# Patient Record
Sex: MALE | Race: BLACK OR AFRICAN AMERICAN | URBAN - METROPOLITAN AREA
[De-identification: names, ages, dates, MRNs, and addresses within clinical notes are randomized per-mention and may not be internally consistent; named-entity substitution may affect disease eponyms.]

---

## 2021-03-24 ENCOUNTER — HOSPITAL ENCOUNTER (OUTPATIENT)
Dept: SLEEP MEDICINE | Age: 41
Discharge: HOME | End: 2021-03-24
Payer: COMMERCIAL

## 2021-03-24 PROCEDURE — 95810 POLYSOM 6/> YRS 4/> PARAM: CPT

## 2021-04-05 PROBLEM — G47.33 OSA (OBSTRUCTIVE SLEEP APNEA): Status: ACTIVE | Noted: 2021-04-05

## 2021-04-05 PROBLEM — E66.01 MORBID OBESITY WITH BMI OF 40.0-44.9, ADULT (HCC): Status: ACTIVE | Noted: 2021-04-05

## 2021-04-05 PROBLEM — G47.00 FREQUENT NOCTURNAL AWAKENING: Status: ACTIVE | Noted: 2021-04-05

## 2022-03-19 PROBLEM — G47.00 FREQUENT NOCTURNAL AWAKENING: Status: ACTIVE | Noted: 2021-04-05

## 2022-03-19 PROBLEM — G47.33 OSA (OBSTRUCTIVE SLEEP APNEA): Status: ACTIVE | Noted: 2021-04-05

## 2022-03-20 PROBLEM — E66.01 MORBID OBESITY WITH BMI OF 40.0-44.9, ADULT (HCC): Status: ACTIVE | Noted: 2021-04-05

## 2022-04-28 PROBLEM — G47.00 INSOMNIA: Status: ACTIVE | Noted: 2022-04-28

## 2022-09-20 NOTE — PROGRESS NOTES
[address] [place] [address] Court, [address] [facility]  [phone]    Patient Name:  Nerissa Cordova  YOB: 1980    Pursuant to the emergency declaration under the [address] [place] [address] waiver authority and the [employer] and [employer] Act, this Virtual  Visit was conducted, with patient's consent, to reduce the patient's risk of exposure to COVID-19 and provide continuity of care for an established patient. Telehealth encounter is a billable service, with coverage as determined by the insurance carrier. Services were provided through a video synchronous discussion virtually to substitute for in-person clinic visit. Nerissa Cordova is a 38 y.o. male who was seen by synchronous (real-time) audio-video technology on 9/22/2022. Consent:  He and/or his healthcare decision maker is aware that this patient-initiated Telehealth encounter is a billable service, with coverage as determined by his insurance carrier. He is aware that he may receive a bill and has provided verbal consent to proceed: Yes        Office Visit 9/22/2022    CHIEF COMPLAINT:    Chief Complaint   Patient presents with    Sleep Apnea    Medication Refill         HISTORY OF PRESENT ILLNESS:  Patient is being seen today via virtual visit. Sleep study completed 3/24/2021 with AHI 9.2 events per hour with lowest oxygen saturation 81%. He is prescribed auto CPAP 5 to 15 cm using a nasal mask. Download reveals 80% compliance over the last 30 days with average nightly use six hours and 46 minutes. AHI is 1.5 events per hour with average pressure used 7 to 10.6 cm. He feels the initial pressure is too low and pressure will be adjusted to APAP 7 to 15 cm. He states that he wakes twice in the night but is able to go back to sleep easily. He takes Lunesta 2 mg and trazodone 100 mg nightly to help with insomnia.  He continues to have improved sleep quality and feels rested when he takes his medications. He denies any side effects. He last filled lunesta on 8/24/22. There is no evidence of abuse on the [place] scripts database.        Sleep Medicine 9/22/2022   Sitting and reading 0   Watching TV 0   Sitting, inactive in a public place (e.g. a theatre or a meeting) 0   As a passenger in a car for an hour without a break 0   Lying down to rest in the afternoon when circumstances permit 2   Sitting and talking to someone 0   Sitting quietly after a lunch without alcohol 0   In a car, while stopped for a few minutes in traffic 0   [place] Sleepiness Score 2               Past Medical History:   Diagnosis Date    Abnormal TSH     Adult BMI 40.0-44.9 kg/sq m ([address]     Allergic reaction     Benign essential hypertension     H/O seasonal allergies     Hyperlipidemia     Morbid obesity ([address]     Other ill-defined conditions(799.89)     staph    Tachycardia     Vitamin D deficiency        Patient Active Problem List   Diagnosis    Benign essential hypertension    Frequent nocturnal awakening    H/O seasonal allergies    Tachycardia    ERENDIRA (obstructive sleep apnea)    Morbid obesity with BMI of 40.0-44.9, adult ([address]    Insomnia           Past Surgical History:   Procedure Laterality Date    CARPAL TUNNEL RELEASE Right            Social History     Socioeconomic History    Marital status:      Spouse name: Not on file    Number of children: Not on file    Years of education: Not on file    Highest education level: Not on file   Occupational History    Not on file   Tobacco Use    Smoking status: Never    Smokeless tobacco: Never   Substance and Sexual Activity    Alcohol use: Yes    Drug use: No    Sexual activity: Not on file   Other Topics Concern    Not on file   Social History Narrative    Not on file     Social Determinants of Health     Financial Resource Strain: Not on file   Food Insecurity: Not on file   Transportation Needs: Not on file Physical Activity: Not on file   Stress: Not on file   Social Connections: Not on file   Intimate Partner Violence: Not on file   Housing Stability: Not on file         History reviewed. No pertinent family history. Allergies   Allergen Reactions    Lisinopril Angioedema    Penicillins Other (See Comments)    Sulfa Antibiotics Other (See Comments)         Current Outpatient Medications   Medication Sig    eszopiclone (LUNESTA) 2 MG TABS Take 1 tablet by mouth nightly. traZODone (DESYREL) 100 MG tablet Take 1 tablet by mouth nightly as needed for Sleep    amLODIPine (NORVASC) 10 MG tablet Take by mouth daily    Cholecalciferol 50 MCG (2000 UT) CAPS Take by mouth 2 times daily    hydroCHLOROthiazide (HYDRODIURIL) 25 MG tablet TAKE 1 TABLET(25 MG) BY MOUTH 1 TIME EACH DAY    metoprolol succinate (TOPROL XL) 25 MG extended release tablet Take 25 mg by mouth daily    triamcinolone (NASACORT) 55 MCG/ACT nasal inhaler 2 sprays daily     No current facility-administered medications for this visit. REVIEW OF SYSTEMS:   CONSTITUTIONAL:   There is no history of fever, chills, night sweats. CARDIAC:   No chest pain, pressure, discomfort, palpitations, orthopnea, murmurs, or edema. GI:   No dysphagia, heartburn reflux, nausea/vomiting, diarrhea, abdominal pain, or bleeding. NEURO:   There is no history of AMS, persistent headache, decreased level of consciousness, seizures, or motor or sensory deficits. VIRTUAL EXAM  PHYSICAL EXAMINATION:  [ INSTRUCTIONS:  \"[x]\" Indicates a positive item  \"[]\" Indicates a negative item   Vital Signs: (As obtained by patient/caregiver at home)  There were no vitals taken for this visit.      Constitutional: [x] Appears well-developed and well-nourished [x] No apparent distress      [] Abnormal     Mental status: [x] Alert and awake  [x] Oriented to person/place/time [x] Able to follow commands    [] Abnormal -     Eyes:   EOM    [x]  Normal    [] Abnormal - Sclera  [x]  Normal    [] Abnormal -          Discharge [x]  None visible   [] Abnormal -    HENT: [x] Normocephalic, atraumatic  [] Abnormal -  [x] Mouth/Throat: Mucous membranes are moist    External Ears [x] Normal  [] Abnormal -    Neck: [x] No visualized mass [] Abnormal -     Pulmonary/Chest: [x] Respiratory effort normal   [x] No visualized signs of difficulty breathing or respiratory distress        [] Abnormal -      Musculoskeletal:   [x] Normal gait with no signs of ataxia         [x] Normal range of motion of neck        [] Abnormal -     Neurological:        [x] No Facial Asymmetry (Cranial nerve 7 motor function) (limited exam due to video visit)          [x] No gaze palsy        [] Abnormal -         Skin:        [x] No significant exanthematous lesions or discoloration noted on facial skin         [] Abnormal -            Psychiatric:       [x] Normal Affect [] Abnormal -       [x] No Hallucinations    Other pertinent observable physical exam findings:-      ASSESSMENT:  (Medical Decision Making)      Diagnosis Orders   1. ERENDIRA (obstructive sleep apnea)     AHI well controlled. Will change settings to help with tolerance. 2. Insomnia, unspecified type     Improved on lunesta and trazodone   3.  Frequent nocturnal awakening     Improved with medications         PLAN:  Change pressure to APAP 7-15 cm  Renew supplies  Refill lunesta and trazodone  Follow up in 6 months or sooner if needed     Orders Placed This Encounter   Procedures    DME - [address]  Change pressure to APAP 7-15 cm    [facility]  Phone: [address] [address]  Dept: [phone]      Patient Name: Klaus Callaway  : 1980  Gender: male  Address: [address] [place] [address] [address] [id number]   Patient phone: [phone] (home)       Primary Insurance: Payor: Inga Harrison / Plan: Carlos Be / Product Type: *No Product type* /   Subscriber ID: [id number] - ([facility])      AMB Supply Order  Order Details     DME Location:   Order Date: 9/22/2022   The primary encounter diagnosis was ERENDIRA (obstructive sleep apnea). Diagnoses of Insomnia, unspecified type and Frequent nocturnal awakening were also pertinent to this visit. (  X   )Supplies Needed       apap Machine   (     ) CPAP Unit  (     ) Auto CPAP Unit  (     ) BiLevel Unit  (     ) Auto BiLevel Unit  (     ) ASV        (     ) Bilevel ST      Length of need: 12 months    Pressure:  7-15 cmH20  EPR:     Starting Ramp Pressure:   cm H20  Ramp Time: min        Patient had a diagnostic Apnea Hypopnea Index (AHI) of :    *SUPPLIES* Replace all as needed, or per coverage guidelines     Masks Type:  (    ) -Full Face Mask (1 per 3 mon)  (    ) -Full Mask (1 per month) Interface/Cushion      ( x ) -Nasal Mask (1 per 3 mon)  ( x  ) - Nasal Mask (1 per month) Interface/Cushion  (  x   ) -Pillow (2 per mon)  (     ) -Lkvubxcfl (1 per 6 mon)            Other Supplies:    (   X  )-Gfvspnmc (1 per 6 mon)  (   X  )-Ugsdkr Tubing (1 per 3 mon)  (   X  )- Disposable Filter (2 per mon)  (   x  )-Fpspvl Humidifier (1 per year)     (  x   )-Hzkbisomx (sometimes used with Full Face Mask) (1 per 6 mos)  (    )-Tubing-without heat (1 per 3 mos)  (     )-Non-Disposable Filter (1 per 6 mos)  (  x   )-Water Chamber (1 per 6 mos)  (     )-Humidifier non-heated (1 per 5 yrs)      Signed Date: 9/22/2022  Electronically Signed By: YUKO Diaz - LISY  Electronically Dated:  9/22/2022       Orders Placed This Encounter   Medications    eszopiclone (LUNESTA) 2 MG TABS     Sig: Take 1 tablet by mouth nightly.      Dispense:  30 tablet     Refill:  5    traZODone (DESYREL) 100 MG tablet     Sig: Take 1 tablet by mouth nightly as needed for Sleep     Dispense:  30 tablet     Refill:  5            Collaborating Physician: Dr. Rosalio Schwarz      I spent at least 20 minutes with this established patient, and >50% of the time was spent counseling and/or coordinating care regarding vivien, insomnia.     YUKO Arellano - CNP  Electronically signed

## 2022-09-22 ENCOUNTER — TELEMEDICINE (OUTPATIENT)
Dept: SLEEP MEDICINE | Age: 42
End: 2022-09-22
Payer: COMMERCIAL

## 2022-09-22 DIAGNOSIS — G47.00 FREQUENT NOCTURNAL AWAKENING: ICD-10-CM

## 2022-09-22 DIAGNOSIS — G47.33 OSA (OBSTRUCTIVE SLEEP APNEA): Primary | ICD-10-CM

## 2022-09-22 DIAGNOSIS — G47.00 INSOMNIA, UNSPECIFIED TYPE: ICD-10-CM

## 2022-09-22 PROCEDURE — 99214 OFFICE O/P EST MOD 30 MIN: CPT | Performed by: NURSE PRACTITIONER

## 2022-09-22 RX ORDER — ESZOPICLONE 2 MG/1
2 TABLET, FILM COATED ORAL NIGHTLY
Qty: 30 TABLET | Refills: 5 | Status: SHIPPED | OUTPATIENT
Start: 2022-09-22 | End: 2023-09-22

## 2022-09-22 RX ORDER — TRAZODONE HYDROCHLORIDE 100 MG/1
100 TABLET ORAL NIGHTLY PRN
Qty: 30 TABLET | Refills: 5 | Status: SHIPPED | OUTPATIENT
Start: 2022-09-22

## 2022-09-22 ASSESSMENT — SLEEP AND FATIGUE QUESTIONNAIRES
HOW LIKELY ARE YOU TO NOD OFF OR FALL ASLEEP WHILE SITTING AND TALKING TO SOMEONE: 0
HOW LIKELY ARE YOU TO NOD OFF OR FALL ASLEEP WHILE SITTING AND READING: 0
HOW LIKELY ARE YOU TO NOD OFF OR FALL ASLEEP WHILE WATCHING TV: 0
HOW LIKELY ARE YOU TO NOD OFF OR FALL ASLEEP WHEN YOU ARE A PASSENGER IN A CAR FOR AN HOUR WITHOUT A BREAK: 0
HOW LIKELY ARE YOU TO NOD OFF OR FALL ASLEEP WHILE LYING DOWN TO REST IN THE AFTERNOON WHEN CIRCUMSTANCES PERMIT: 2
HOW LIKELY ARE YOU TO NOD OFF OR FALL ASLEEP WHILE SITTING INACTIVE IN A PUBLIC PLACE: 0
HOW LIKELY ARE YOU TO NOD OFF OR FALL ASLEEP IN A CAR, WHILE STOPPED FOR A FEW MINUTES IN TRAFFIC: 0
HOW LIKELY ARE YOU TO NOD OFF OR FALL ASLEEP WHILE SITTING QUIETLY AFTER LUNCH WITHOUT ALCOHOL: 0
ESS TOTAL SCORE: 2

## 2023-01-17 ENCOUNTER — CLINICAL DOCUMENTATION (OUTPATIENT)
Dept: SLEEP MEDICINE | Age: 43
End: 2023-01-17

## 2023-01-17 NOTE — PROGRESS NOTES
Patient came to window to drop off paperwork that needs to be filled out by Matias. Patient said paperwork can be faxed to number that's listed in the documents. I put white envelope in Nikia[place]ake dunaway.

## 2023-01-23 ENCOUNTER — CLINICAL DOCUMENTATION (OUTPATIENT)
Dept: SLEEP MEDICINE | Age: 43
End: 2023-01-23

## 2023-02-22 ENCOUNTER — TELEPHONE (OUTPATIENT)
Dept: SLEEP MEDICINE | Age: 43
End: 2023-02-22

## 2023-03-01 ENCOUNTER — TELEPHONE (OUTPATIENT)
Dept: PULMONOLOGY | Age: 43
End: 2023-03-01

## 2023-03-21 DIAGNOSIS — G47.00 INSOMNIA, UNSPECIFIED TYPE: ICD-10-CM

## 2023-03-21 DIAGNOSIS — G47.33 OSA (OBSTRUCTIVE SLEEP APNEA): ICD-10-CM

## 2023-03-21 DIAGNOSIS — G47.00 FREQUENT NOCTURNAL AWAKENING: ICD-10-CM

## 2023-03-21 RX ORDER — ESZOPICLONE 2 MG/1
TABLET, FILM COATED ORAL
Qty: 30 TABLET | OUTPATIENT
Start: 2023-03-21

## 2023-03-21 NOTE — PROGRESS NOTES
3/23/2023     Orders Placed This Encounter   Medications    traZODone (DESYREL) 100 MG tablet     Sig: Take 1 tablet by mouth nightly as needed for Sleep     Dispense:  30 tablet     Refill:  5    eszopiclone (LUNESTA) 2 MG TABS     Sig: Take 1 tablet by mouth nightly. Dispense:  30 tablet     Refill:  5          Collaborating Physician: Dr. Gus Mitchell      I spent at least 20 minutes with this established patient, and >50% of the time was spent counseling and/or coordinating care regarding vivien.     YUKO Zavala CNP  Electronically signed

## 2023-03-23 ENCOUNTER — TELEMEDICINE (OUTPATIENT)
Dept: SLEEP MEDICINE | Age: 43
End: 2023-03-23
Payer: COMMERCIAL

## 2023-03-23 DIAGNOSIS — G47.33 OSA (OBSTRUCTIVE SLEEP APNEA): Primary | ICD-10-CM

## 2023-03-23 DIAGNOSIS — G47.00 FREQUENT NOCTURNAL AWAKENING: ICD-10-CM

## 2023-03-23 DIAGNOSIS — G47.00 INSOMNIA, UNSPECIFIED TYPE: ICD-10-CM

## 2023-03-23 PROCEDURE — 99214 OFFICE O/P EST MOD 30 MIN: CPT | Performed by: NURSE PRACTITIONER

## 2023-03-23 RX ORDER — ESZOPICLONE 2 MG/1
2 TABLET, FILM COATED ORAL NIGHTLY
Qty: 30 TABLET | Refills: 5 | Status: SHIPPED | OUTPATIENT
Start: 2023-03-23 | End: 2024-03-22

## 2023-03-23 RX ORDER — TRAZODONE HYDROCHLORIDE 100 MG/1
100 TABLET ORAL NIGHTLY PRN
Qty: 30 TABLET | Refills: 5 | Status: SHIPPED | OUTPATIENT
Start: 2023-03-23

## 2023-03-23 ASSESSMENT — SLEEP AND FATIGUE QUESTIONNAIRES
HOW LIKELY ARE YOU TO NOD OFF OR FALL ASLEEP WHEN YOU ARE A PASSENGER IN A CAR FOR AN HOUR WITHOUT A BREAK: 0
ESS TOTAL SCORE: 4
HOW LIKELY ARE YOU TO NOD OFF OR FALL ASLEEP WHILE WATCHING TV: 1
HOW LIKELY ARE YOU TO NOD OFF OR FALL ASLEEP WHILE SITTING AND READING: 0
HOW LIKELY ARE YOU TO NOD OFF OR FALL ASLEEP WHILE SITTING QUIETLY AFTER LUNCH WITHOUT ALCOHOL: 0
HOW LIKELY ARE YOU TO NOD OFF OR FALL ASLEEP WHILE SITTING INACTIVE IN A PUBLIC PLACE: 0
HOW LIKELY ARE YOU TO NOD OFF OR FALL ASLEEP WHILE SITTING AND TALKING TO SOMEONE: 0
HOW LIKELY ARE YOU TO NOD OFF OR FALL ASLEEP IN A CAR, WHILE STOPPED FOR A FEW MINUTES IN TRAFFIC: 0
HOW LIKELY ARE YOU TO NOD OFF OR FALL ASLEEP WHILE LYING DOWN TO REST IN THE AFTERNOON WHEN CIRCUMSTANCES PERMIT: 3

## 2023-09-19 DIAGNOSIS — G47.00 INSOMNIA, UNSPECIFIED TYPE: ICD-10-CM

## 2023-09-19 DIAGNOSIS — G47.33 OSA (OBSTRUCTIVE SLEEP APNEA): ICD-10-CM

## 2023-09-19 DIAGNOSIS — G47.00 FREQUENT NOCTURNAL AWAKENING: ICD-10-CM

## 2023-09-20 ENCOUNTER — PATIENT MESSAGE (OUTPATIENT)
Dept: SLEEP MEDICINE | Age: 43
End: 2023-09-20

## 2023-09-20 RX ORDER — ESZOPICLONE 2 MG/1
2 TABLET, FILM COATED ORAL
Qty: 30 TABLET | OUTPATIENT
Start: 2023-09-20

## 2023-09-21 NOTE — PROGRESS NOTES
Abnormal -  [x] Mouth/Throat: Mucous membranes are moist    External Ears [x] Normal  [] Abnormal -    Neck: [x] No visualized mass [] Abnormal -     Pulmonary/Chest: [x] Respiratory effort normal   [x] No visualized signs of difficulty breathing or respiratory distress        [] Abnormal -      Musculoskeletal:   [x] Normal gait with no signs of ataxia         [x] Normal range of motion of neck        [] Abnormal -     Neurological:        [x] No Facial Asymmetry (Cranial nerve 7 motor function) (limited exam due to video visit)          [x] No gaze palsy        [] Abnormal -         Skin:        [x] No significant exanthematous lesions or discoloration noted on facial skin         [] Abnormal -            Psychiatric:       [x] Normal Affect [] Abnormal -       [x] No Hallucinations    Other pertinent observable physical exam findings:-      ASSESSMENT:  (Medical Decision Making)      Diagnosis Orders   1. ERENDIRA (obstructive sleep apnea)  eszopiclone (LUNESTA) 2 MG TABS   he is using and benefiting from Pap therapy. AHI is well controlled. Continue current settings DME - DURABLE MEDICAL EQUIPMENT      2. Insomnia, unspecified type  eszopiclone (LUNESTA) 2 MG TABS   refill Lunesta and trazodone DME - DURABLE MEDICAL EQUIPMENT      3.  Frequent nocturnal awakening  eszopiclone (LUNESTA) 2 MG TABS   this is improved on CPAP and medication DME - DURABLE MEDICAL EQUIPMENT           PLAN:  Continue APAP 7-15 cm  Renew supplies  Refill lunesta and trazodone    Follow up will be in 6 months for med refill or sooner if needed    Orders Placed This Encounter   Procedures    DME - [facility]  Phone: [address] [place] [address] [phone] [facility] [address]  Dept: [phone]      Patient Name: Nichol Roberto  : 1980  Gender: male  Address: [address] [place] [address],  [address] [place] [address]   Patient phone: [phone] (home)       Primary Insurance:

## 2023-09-21 NOTE — TELEPHONE ENCOUNTER
From: Divine Frazier  To: Anita Langston  Sent: 9/20/2023 4:24 PM EDT  Subject: Marrian Vikram    I have a visit scheduled for next week. My Lunesta prescription goes out today. I requested a refill and I was told it was denied. Can you please submit it for a refill so I wont be out.

## 2023-09-25 ENCOUNTER — TELEMEDICINE (OUTPATIENT)
Dept: SLEEP MEDICINE | Age: 43
End: 2023-09-25
Payer: COMMERCIAL

## 2023-09-25 DIAGNOSIS — G47.00 INSOMNIA, UNSPECIFIED TYPE: ICD-10-CM

## 2023-09-25 DIAGNOSIS — G47.33 OSA (OBSTRUCTIVE SLEEP APNEA): ICD-10-CM

## 2023-09-25 DIAGNOSIS — G47.00 FREQUENT NOCTURNAL AWAKENING: ICD-10-CM

## 2023-09-25 PROCEDURE — 99214 OFFICE O/P EST MOD 30 MIN: CPT | Performed by: NURSE PRACTITIONER

## 2023-09-25 RX ORDER — ESZOPICLONE 2 MG/1
2 TABLET, FILM COATED ORAL NIGHTLY
Qty: 30 TABLET | Refills: 5 | Status: SHIPPED | OUTPATIENT
Start: 2023-09-25 | End: 2024-09-24

## 2023-09-25 RX ORDER — TRAZODONE HYDROCHLORIDE 100 MG/1
100 TABLET ORAL NIGHTLY PRN
Qty: 30 TABLET | Refills: 5 | Status: SHIPPED | OUTPATIENT
Start: 2023-09-25

## 2023-09-25 ASSESSMENT — SLEEP AND FATIGUE QUESTIONNAIRES
HOW LIKELY ARE YOU TO NOD OFF OR FALL ASLEEP IN A CAR, WHILE STOPPED FOR A FEW MINUTES IN TRAFFIC: 0
HOW LIKELY ARE YOU TO NOD OFF OR FALL ASLEEP WHILE SITTING AND TALKING TO SOMEONE: 0
HOW LIKELY ARE YOU TO NOD OFF OR FALL ASLEEP WHILE SITTING INACTIVE IN A PUBLIC PLACE: 0
HOW LIKELY ARE YOU TO NOD OFF OR FALL ASLEEP WHILE WATCHING TV: 0
ESS TOTAL SCORE: 0
HOW LIKELY ARE YOU TO NOD OFF OR FALL ASLEEP WHILE SITTING AND READING: 0
HOW LIKELY ARE YOU TO NOD OFF OR FALL ASLEEP WHILE LYING DOWN TO REST IN THE AFTERNOON WHEN CIRCUMSTANCES PERMIT: 0
HOW LIKELY ARE YOU TO NOD OFF OR FALL ASLEEP WHILE SITTING QUIETLY AFTER LUNCH WITHOUT ALCOHOL: 0
HOW LIKELY ARE YOU TO NOD OFF OR FALL ASLEEP WHEN YOU ARE A PASSENGER IN A CAR FOR AN HOUR WITHOUT A BREAK: 0

## 2023-09-28 ENCOUNTER — TELEPHONE (OUTPATIENT)
Dept: SLEEP MEDICINE | Age: 43
End: 2023-09-28

## 2023-09-28 NOTE — TELEPHONE ENCOUNTER
LVM called patient to call back and schedule 6mos visit.     [address], [address] [facility]

## 2023-09-28 NOTE — TELEPHONE ENCOUNTER
----- Message from YUKO Hamlin CNP sent at 9/26/2023 12:43 PM EDT -----  Regarding: RE: Appointment concern  Contact: [phone]  6 months. I'm not sure why it was scheduled out a year. My LOS even says 6 months     ----- Message -----  From: Yunior Rosas MA  Sent: 9/26/2023  11:51 AM EDT  To: YUKO Hamlin CNP  Subject: FW: Appointment concern                          Would you like the patient rescheduled to fit the 6mos time frame or can we send out refills when its time for them?   ----- Message -----  From: Nichol Roberto \"Johnathan\"  Sent: 9/25/2023   4:09 PM EDT  To: #  Subject: Appointment concern                              Hello. I just received an email with my next appointment. It's scheduled over a year away, but I was only given 6 months of medicine. Can I please get this corrected and scheduled before meds run out?

## 2023-10-03 ENCOUNTER — TELEPHONE (OUTPATIENT)
Dept: SLEEP MEDICINE | Age: 43
End: 2023-10-03

## 2024-01-24 ENCOUNTER — TELEMEDICINE (OUTPATIENT)
Dept: SLEEP MEDICINE | Age: 44
End: 2024-01-24
Payer: COMMERCIAL

## 2024-01-24 ENCOUNTER — TELEPHONE (OUTPATIENT)
Dept: SLEEP MEDICINE | Age: 44
End: 2024-01-24

## 2024-01-24 DIAGNOSIS — G47.00 INSOMNIA, UNSPECIFIED TYPE: ICD-10-CM

## 2024-01-24 DIAGNOSIS — G47.00 FREQUENT NOCTURNAL AWAKENING: ICD-10-CM

## 2024-01-24 DIAGNOSIS — G47.33 OSA (OBSTRUCTIVE SLEEP APNEA): Primary | ICD-10-CM

## 2024-01-24 PROCEDURE — 99213 OFFICE O/P EST LOW 20 MIN: CPT | Performed by: NURSE PRACTITIONER

## 2024-01-24 RX ORDER — ESZOPICLONE 2 MG/1
2 TABLET, FILM COATED ORAL NIGHTLY
Qty: 30 TABLET | Refills: 5 | Status: SHIPPED | OUTPATIENT
Start: 2024-01-24 | End: 2024-07-22

## 2024-01-24 RX ORDER — TRAZODONE HYDROCHLORIDE 100 MG/1
100 TABLET ORAL NIGHTLY PRN
Qty: 30 TABLET | Refills: 5 | Status: SHIPPED | OUTPATIENT
Start: 2024-01-24

## 2024-01-24 ASSESSMENT — SLEEP AND FATIGUE QUESTIONNAIRES
HOW LIKELY ARE YOU TO NOD OFF OR FALL ASLEEP WHILE LYING DOWN TO REST IN THE AFTERNOON WHEN CIRCUMSTANCES PERMIT: 0
HOW LIKELY ARE YOU TO NOD OFF OR FALL ASLEEP WHILE SITTING AND TALKING TO SOMEONE: 0
HOW LIKELY ARE YOU TO NOD OFF OR FALL ASLEEP WHEN YOU ARE A PASSENGER IN A CAR FOR AN HOUR WITHOUT A BREAK: 0
HOW LIKELY ARE YOU TO NOD OFF OR FALL ASLEEP WHILE SITTING QUIETLY AFTER LUNCH WITHOUT ALCOHOL: 0
HOW LIKELY ARE YOU TO NOD OFF OR FALL ASLEEP WHILE WATCHING TV: 0
HOW LIKELY ARE YOU TO NOD OFF OR FALL ASLEEP WHILE SITTING INACTIVE IN A PUBLIC PLACE: 0
HOW LIKELY ARE YOU TO NOD OFF OR FALL ASLEEP IN A CAR, WHILE STOPPED FOR A FEW MINUTES IN TRAFFIC: 0
HOW LIKELY ARE YOU TO NOD OFF OR FALL ASLEEP WHILE SITTING AND READING: 0
ESS TOTAL SCORE: 0

## 2024-01-24 NOTE — PATIENT INSTRUCTIONS
Continue CPAP 7-15 cm H2O with nightly compliance  New CPAP supplies ordered  Trazodone and Lunesta refill  Recommendations as above  Follow-up in 6 months or sooner if needed   Follow up call placed to patient. Patient presented to [facility]  ED on 6/20/19 and was diagnosed with Abdominal pain. No answer, Message left requesting return call.

## 2024-01-24 NOTE — TELEPHONE ENCOUNTER
Mailed pt a drug contract on 01/24/2023. Order placed in [employer] for CPAP SUPPLIES. Sent to [facility]

## 2024-01-24 NOTE — PROGRESS NOTES
[x]  Normal    [] Abnormal -   Sclera  [x]  Normal    [] Abnormal -          Discharge [x]  None visible   [] Abnormal -    HENT: [x] Normocephalic, atraumatic  [] Abnormal -  [x] Mouth/Throat: Mucous membranes are moist    External Ears [x] Normal  [] Abnormal -    Neck: [x] No visualized mass [] Abnormal -     Pulmonary/Chest: [x] Respiratory effort normal   [x] No visualized signs of difficulty breathing or respiratory distress        [] Abnormal -      Musculoskeletal:   [x] Normal gait with no signs of ataxia         [x] Normal range of motion of neck        [] Abnormal -     Neurological:        [x] No Facial Asymmetry (Cranial nerve 7 motor function) (limited exam due to video visit)          [x] No gaze palsy        [] Abnormal -         Skin:        [x] No significant exanthematous lesions or discoloration noted on facial skin         [] Abnormal -            Psychiatric:       [x] Normal Affect [] Abnormal -       [x] No Hallucinations    Other pertinent observable physical exam findings:-      ASSESSMENT:  (Medical Decision Making)       ICD-10-CM    1. ERENDIRA (obstructive sleep apnea)  G47.33 DME - DURABLE MEDICAL EQUIPMENT - Patient is using, compliant and benefiting from Pap therapy. Continue current settings as AHI is down to 2.3 events per hour.        2. Insomnia, unspecified type  G47.00 Refill trazodone and Lunesta as patient reports he is doing well with these medications with no adverse side effects            PLAN:    Continue CPAP 7-15 cm H2O with nightly compliance  New CPAP supplies ordered  Trazodone and Lunesta refill  Recommendations as above  Follow-up in 6 months or sooner if needed    Orders Placed This Encounter    DME - DURABLE MEDICAL EQUIPMENT     [facility]  Phone: [address]   [facility] [address]  Dept: [phone]      Patient Name: Franc Deutsch  : 1980  Gender: male  Address: [address]  [facility]

## 2024-02-21 DIAGNOSIS — G47.33 OSA (OBSTRUCTIVE SLEEP APNEA): ICD-10-CM

## 2024-02-21 DIAGNOSIS — G47.00 FREQUENT NOCTURNAL AWAKENING: ICD-10-CM

## 2024-02-21 DIAGNOSIS — G47.00 INSOMNIA, UNSPECIFIED TYPE: ICD-10-CM

## 2024-02-21 RX ORDER — TRAZODONE HYDROCHLORIDE 100 MG/1
100 TABLET ORAL NIGHTLY PRN
Qty: 30 TABLET | Refills: 5 | OUTPATIENT
Start: 2024-02-21

## 2024-02-21 RX ORDER — ESZOPICLONE 2 MG/1
2 TABLET, FILM COATED ORAL NIGHTLY
Qty: 30 TABLET | Refills: 5 | OUTPATIENT
Start: 2024-02-21 | End: 2024-08-19

## 2024-04-17 RX ORDER — TRAZODONE HYDROCHLORIDE 100 MG/1
100 TABLET ORAL NIGHTLY PRN
Qty: 30 TABLET | Refills: 2 | Status: SHIPPED | OUTPATIENT
Start: 2024-04-17

## 2024-07-01 NOTE — PROGRESS NOTES
He sleeps 6 and half hours nightly and feels rested in the mornings.  He denies any daytime sleepiness or napping.  Denies any parasomnias or known side effects of these medications.  His last fill of Lunesta was June 17.  Will update his new prescription to start on July 17 with a 6-month refill.    [place] Scripts site. No signs of abuse      EPWORTH SCALE:      7/1/2024     2:30 PM 1/24/2024     2:10 PM 9/25/2023    11:22 AM 3/23/2023     1:19 PM 9/22/2022    12:59 PM 4/28/2022     2:10 PM   Sleep Medicine   Sitting and reading 1 0 0 0 0 0   Watching TV 0 0 0 1 0 1   Sitting, inactive in a public place (e.g. a theatre or a meeting) 0 0 0 0 0 0   As a passenger in a car for an hour without a break 0 0 0 0 0 0   Lying down to rest in the afternoon when circumstances permit 1 0 0 3 2 2   Sitting and talking to someone 0 0 0 0 0 0   Sitting quietly after a lunch without alcohol 0 0 0 0 0 0   In a car, while stopped for a few minutes in traffic 0 0 0 0 0 0   [place] Sleepiness Score 2 0 0 4 2 3     DOWNLOAD:         Past Medical History:   Diagnosis Date    Abnormal TSH     Adult BMI 40.0-44.9 kg/sq m (HCC)     Allergic reaction     Benign essential hypertension     H/O seasonal allergies     Hyperlipidemia     Morbid obesity (HCC)     Other ill-defined conditions(799.89)     staph    Tachycardia     Vitamin D deficiency        Patient Active Problem List   Diagnosis    Benign essential hypertension    Frequent nocturnal awakening    H/O seasonal allergies    Tachycardia    ERENDIRA (obstructive sleep apnea)    Morbid obesity with BMI of 40.0-44.9, adult (HCC)    Insomnia           Past Surgical History:   Procedure Laterality Date    CARPAL TUNNEL RELEASE Right            Social History     Socioeconomic History    Marital status:      Spouse name: Not on file    Number of children: Not on file    Years of education: Not on file    Highest education level: Not on file   Occupational History    Not on file   Tobacco Use

## 2024-07-02 ENCOUNTER — TELEMEDICINE (OUTPATIENT)
Dept: SLEEP MEDICINE | Age: 44
End: 2024-07-02
Payer: COMMERCIAL

## 2024-07-02 DIAGNOSIS — G47.00 INSOMNIA, UNSPECIFIED TYPE: ICD-10-CM

## 2024-07-02 DIAGNOSIS — G47.00 FREQUENT NOCTURNAL AWAKENING: ICD-10-CM

## 2024-07-02 DIAGNOSIS — G47.33 OSA (OBSTRUCTIVE SLEEP APNEA): Primary | ICD-10-CM

## 2024-07-02 PROCEDURE — 99214 OFFICE O/P EST MOD 30 MIN: CPT | Performed by: NURSE PRACTITIONER

## 2024-07-02 PROCEDURE — G2211 COMPLEX E/M VISIT ADD ON: HCPCS | Performed by: NURSE PRACTITIONER

## 2024-07-02 RX ORDER — TRAZODONE HYDROCHLORIDE 100 MG/1
100 TABLET ORAL NIGHTLY PRN
Qty: 30 TABLET | Refills: 5 | Status: SHIPPED | OUTPATIENT
Start: 2024-07-02

## 2024-07-02 RX ORDER — ESZOPICLONE 2 MG/1
2 TABLET, FILM COATED ORAL NIGHTLY PRN
Qty: 30 TABLET | Refills: 5 | Status: SHIPPED | OUTPATIENT
Start: 2024-07-17 | End: 2025-01-13

## 2024-08-27 ENCOUNTER — TELEPHONE (OUTPATIENT)
Dept: SLEEP MEDICINE | Age: 44
End: 2024-08-27

## 2025-01-12 DIAGNOSIS — G47.00 FREQUENT NOCTURNAL AWAKENING: ICD-10-CM

## 2025-01-12 DIAGNOSIS — G47.00 INSOMNIA, UNSPECIFIED TYPE: ICD-10-CM

## 2025-01-12 DIAGNOSIS — G47.33 OSA (OBSTRUCTIVE SLEEP APNEA): ICD-10-CM

## 2025-01-16 NOTE — PROGRESS NOTES
[facility]  [address]  [place] [address]  [phone]    Patient Name:  Franc Deutsch  YOB: 1980    Franc Deutsch, was evaluated through a synchronous (real-time) audio-video encounter. The patient (or guardian if applicable) is aware that this is a billable service, which includes applicable co-pays. This Virtual Visit was conducted with patient's (and/or legal guardian's) consent. Patient identification was verified, and a caregiver was present when appropriate.   The patient was located at Other: in [place], [place]   Provider was located at Home (Appt Dept State): [place]  Confirm you are appropriately licensed, registered, or certified to deliver care in the state where the patient is located as indicated above. If you are not or unsure, please re-schedule the visit: Yes, I confirm.          --YUKO Mckeon - CNP on 1/17/2025 at 11:07 AM             Office Visit 1/17/2025    CHIEF COMPLAINT:    Chief Complaint   Patient presents with    CPAP/BiPAP    Sleep Apnea    Insomnia    Medication Refill     lunesta & trazodone       HISTORY OF PRESENT ILLNESS:  Patient is being seen today via virtual visit for follow-up of sleep apnea and insomnia.    He had a sleep study 3/24/2021 with AHI 9.7 events per hour with desaturations to 81%.  He is prescribed auto CPAP 7 to 15 cm using a nasal mask.  Download reveals 95% compliance over the past 6 months with average nightly use 7 hours and 18 minutes.  AHI is 2.2 events per hour with average pressure use 9.3 to 13.1 cm. He continues to sleep well on therapy and denies any problems with mask or pressure.      He is taking trazodone 100 mg and Lunesta 2 mg for insomnia and continues to note improvement in sleep quality with this combination. He states about 3 days a week, he may have trouble falling asleep without taking about an hour.  He takes his medication about 30 minutes before bed.

## 2025-01-17 ENCOUNTER — TELEMEDICINE (OUTPATIENT)
Dept: SLEEP MEDICINE | Age: 45
End: 2025-01-17

## 2025-01-17 DIAGNOSIS — G47.00 INSOMNIA, UNSPECIFIED TYPE: ICD-10-CM

## 2025-01-17 DIAGNOSIS — G47.33 OSA (OBSTRUCTIVE SLEEP APNEA): Primary | ICD-10-CM

## 2025-01-17 PROCEDURE — 99214 OFFICE O/P EST MOD 30 MIN: CPT | Performed by: NURSE PRACTITIONER

## 2025-01-17 RX ORDER — TRAZODONE HYDROCHLORIDE 100 MG/1
100 TABLET ORAL NIGHTLY PRN
Qty: 30 TABLET | Refills: 5 | Status: SHIPPED | OUTPATIENT
Start: 2025-01-17

## 2025-01-17 RX ORDER — ESZOPICLONE 2 MG/1
2 TABLET, FILM COATED ORAL NIGHTLY
Qty: 30 TABLET | Refills: 5 | Status: SHIPPED | OUTPATIENT
Start: 2025-01-17 | End: 2026-01-17

## 2025-01-24 RX ORDER — ESZOPICLONE 2 MG/1
TABLET, FILM COATED ORAL
Qty: 30 TABLET | Refills: 5 | OUTPATIENT
Start: 2025-01-24

## 2025-05-12 ENCOUNTER — TELEPHONE (OUTPATIENT)
Dept: SLEEP MEDICINE | Age: 45
End: 2025-05-12

## 2025-05-12 DIAGNOSIS — G47.33 OSA (OBSTRUCTIVE SLEEP APNEA): Primary | ICD-10-CM

## 2025-05-12 NOTE — TELEPHONE ENCOUNTER
Hello. I feel like I'm starving for air with the Cpap lately. Is it possible to turn up the pressure?